# Patient Record
Sex: FEMALE | Race: WHITE
[De-identification: names, ages, dates, MRNs, and addresses within clinical notes are randomized per-mention and may not be internally consistent; named-entity substitution may affect disease eponyms.]

---

## 2018-02-05 ENCOUNTER — HOSPITAL ENCOUNTER (EMERGENCY)
Dept: HOSPITAL 94 - ER | Age: 33
Discharge: HOME | End: 2018-02-05
Payer: MEDICAID

## 2018-02-05 VITALS — HEIGHT: 68 IN | BODY MASS INDEX: 18.98 KG/M2 | WEIGHT: 125.22 LBS

## 2018-02-05 VITALS — SYSTOLIC BLOOD PRESSURE: 137 MMHG | DIASTOLIC BLOOD PRESSURE: 90 MMHG
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
## 2018-02-05 DIAGNOSIS — Z88.1: ICD-10-CM

## 2018-02-05 DIAGNOSIS — S61.214A: Primary | ICD-10-CM
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
## 2018-02-05 PROCEDURE — 12001 RPR S/N/AX/GEN/TRNK 2.5CM/<: CPT

## 2018-02-05 PROCEDURE — 99283 EMERGENCY DEPT VISIT LOW MDM: CPT

## 2018-04-08 ENCOUNTER — HOSPITAL ENCOUNTER (EMERGENCY)
Dept: HOSPITAL 94 - ER | Age: 33
Discharge: HOME | End: 2018-04-08
Payer: MEDICAID

## 2018-04-08 VITALS — DIASTOLIC BLOOD PRESSURE: 98 MMHG | SYSTOLIC BLOOD PRESSURE: 136 MMHG

## 2018-04-08 VITALS — BODY MASS INDEX: 19.57 KG/M2 | HEIGHT: 64 IN | WEIGHT: 114.64 LBS

## 2018-04-08 DIAGNOSIS — Z60.2: ICD-10-CM

## 2018-04-08 DIAGNOSIS — I88.9: Primary | ICD-10-CM

## 2018-04-08 DIAGNOSIS — Z88.1: ICD-10-CM

## 2018-04-08 DIAGNOSIS — Z79.899: ICD-10-CM

## 2018-04-08 PROCEDURE — 99283 EMERGENCY DEPT VISIT LOW MDM: CPT

## 2018-04-08 SDOH — SOCIAL STABILITY - SOCIAL INSECURITY: PROBLEMS RELATED TO LIVING ALONE: Z60.2

## 2018-08-07 ENCOUNTER — HOSPITAL ENCOUNTER (EMERGENCY)
Dept: HOSPITAL 94 - ER | Age: 33
Discharge: HOME | End: 2018-08-07
Payer: MEDICAID

## 2018-08-07 VITALS — BODY MASS INDEX: 17.3 KG/M2 | WEIGHT: 110.23 LBS | HEIGHT: 67 IN

## 2018-08-07 VITALS — SYSTOLIC BLOOD PRESSURE: 104 MMHG | DIASTOLIC BLOOD PRESSURE: 58 MMHG

## 2018-08-07 DIAGNOSIS — Z90.89: ICD-10-CM

## 2018-08-07 DIAGNOSIS — O23.42: Primary | ICD-10-CM

## 2018-08-07 DIAGNOSIS — Z3A.14: ICD-10-CM

## 2018-08-07 DIAGNOSIS — Z60.2: ICD-10-CM

## 2018-08-07 DIAGNOSIS — Z79.899: ICD-10-CM

## 2018-08-07 LAB
ALBUMIN SERPL BCP-MCNC: 3.3 G/DL (ref 3.4–5)
ALBUMIN/GLOB SERPL: 1.1 {RATIO} (ref 1.1–1.5)
ALP SERPL-CCNC: 49 IU/L (ref 46–116)
ALT SERPL W P-5'-P-CCNC: 19 U/L (ref 12–78)
AMPHETAMINES UR QL SCN: POSITIVE
ANION GAP SERPL CALCULATED.3IONS-SCNC: 6 MMOL/L (ref 8–16)
AST SERPL W P-5'-P-CCNC: 14 U/L (ref 10–37)
BACTERIA URNS QL MICRO: (no result) /HPF
BARBITURATES UR QL SCN: NEGATIVE
BASOPHILS # BLD AUTO: 0 X10'3 (ref 0–0.2)
BASOPHILS NFR BLD AUTO: 0.4 % (ref 0–1)
BENZODIAZ UR QL SCN: POSITIVE
BILIRUB SERPL-MCNC: 0.2 MG/DL (ref 0.1–1)
BUN SERPL-MCNC: 10 MG/DL (ref 7–18)
BUN/CREAT SERPL: 16.7 (ref 6.6–38)
BZE UR QL SCN: NEGATIVE
CALCIUM SERPL-MCNC: 8.6 MG/DL (ref 8.5–10.1)
CANNABINOIDS UR QL SCN: NEGATIVE
CHLORIDE SERPL-SCNC: 101 MMOL/L (ref 99–107)
CLARITY UR: (no result)
CO2 SERPL-SCNC: 29 MMOL/L (ref 24–32)
COLOR UR: YELLOW
CREAT SERPL-MCNC: 0.6 MG/DL (ref 0.4–0.9)
DEPRECATED SQUAMOUS URNS QL MICRO: (no result) /LPF
EOSINOPHIL # BLD AUTO: 0.3 X10'3 (ref 0–0.9)
EOSINOPHIL NFR BLD AUTO: 3.8 % (ref 0–6)
ERYTHROCYTE [DISTWIDTH] IN BLOOD BY AUTOMATED COUNT: 12.5 % (ref 11.5–14.5)
GFR SERPL CREATININE-BSD FRML MDRD: > 90 ML/MIN
GLUCOSE SERPL-MCNC: 82 MG/DL (ref 70–104)
GLUCOSE UR STRIP-MCNC: NEGATIVE MG/DL
HCG SERPL-ACNC: (no result) MIU/ML
HCT VFR BLD AUTO: 32.1 % (ref 35–45)
HGB BLD-MCNC: 11 G/DL (ref 12–16)
HGB UR QL STRIP: NEGATIVE
KETONES UR STRIP-MCNC: NEGATIVE MG/DL
LEUKOCYTE ESTERASE UR QL STRIP: (no result)
LIPASE SERPL-CCNC: 73 U/L (ref 73–393)
LYMPHOCYTES # BLD AUTO: 3.6 X10'3 (ref 1.1–4.8)
LYMPHOCYTES NFR BLD AUTO: 43.3 % (ref 21–51)
MCH RBC QN AUTO: 30.1 PG (ref 27–31)
MCHC RBC AUTO-ENTMCNC: 34.3 % (ref 33–36.5)
MCV RBC AUTO: 87.9 FL (ref 78–98)
METHADONE UR QL SCN: NEGATIVE
MONOCYTES # BLD AUTO: 0.4 X10'3 (ref 0–0.9)
MONOCYTES NFR BLD AUTO: 4.5 % (ref 2–12)
MUCOUS THREADS URNS QL MICRO: (no result) /LPF
NEUTROPHILS # BLD AUTO: 4 X10'3 (ref 1.8–7.7)
NEUTROPHILS NFR BLD AUTO: 48 % (ref 42–75)
NITRITE UR QL STRIP: NEGATIVE
OPIATES UR QL SCN: NEGATIVE
PCP UR QL SCN: NEGATIVE
PH UR STRIP: 6 [PH] (ref 4.8–8)
PLATELET # BLD AUTO: 268 X10'3 (ref 140–440)
PMV BLD AUTO: 8.5 FL (ref 7.4–10.4)
POTASSIUM SERPL-SCNC: 3.3 MMOL/L (ref 3.5–5.1)
PROT SERPL-MCNC: 6.2 G/DL (ref 6.4–8.2)
PROT UR QL STRIP: (no result) MG/DL
RBC # BLD AUTO: 3.65 X10'6 (ref 4.2–5.6)
RBC #/AREA URNS HPF: (no result) /HPF (ref 0–2)
SODIUM SERPL-SCNC: 136 MMOL/L (ref 135–145)
SP GR UR STRIP: 1.02 (ref 1–1.03)
URN COLLECT METHOD CLASS: (no result)
UROBILINOGEN UR STRIP-MCNC: 0.2 E.U/DL (ref 0.2–1)
WBC # BLD AUTO: 8.2 X10'3 (ref 4.5–11)
WBC #/AREA URNS HPF: (no result) /HPF (ref 0–4)

## 2018-08-07 PROCEDURE — 99285 EMERGENCY DEPT VISIT HI MDM: CPT

## 2018-08-07 PROCEDURE — 80053 COMPREHEN METABOLIC PANEL: CPT

## 2018-08-07 PROCEDURE — 83690 ASSAY OF LIPASE: CPT

## 2018-08-07 PROCEDURE — 84702 CHORIONIC GONADOTROPIN TEST: CPT

## 2018-08-07 PROCEDURE — 80305 DRUG TEST PRSMV DIR OPT OBS: CPT

## 2018-08-07 PROCEDURE — 81001 URINALYSIS AUTO W/SCOPE: CPT

## 2018-08-07 PROCEDURE — 85025 COMPLETE CBC W/AUTO DIFF WBC: CPT

## 2018-08-07 PROCEDURE — 76805 OB US >/= 14 WKS SNGL FETUS: CPT

## 2018-08-07 PROCEDURE — 36415 COLL VENOUS BLD VENIPUNCTURE: CPT

## 2018-08-07 SDOH — SOCIAL STABILITY - SOCIAL INSECURITY: PROBLEMS RELATED TO LIVING ALONE: Z60.2

## 2018-08-09 ENCOUNTER — HOSPITAL ENCOUNTER (EMERGENCY)
Dept: HOSPITAL 94 - ER | Age: 33
LOS: 1 days | Discharge: LEFT BEFORE DISCHARGE | End: 2018-08-10
Payer: MEDICAID

## 2018-08-09 VITALS — DIASTOLIC BLOOD PRESSURE: 67 MMHG | SYSTOLIC BLOOD PRESSURE: 107 MMHG

## 2018-08-09 VITALS — WEIGHT: 105.93 LBS | HEIGHT: 67 IN | BODY MASS INDEX: 16.63 KG/M2

## 2018-08-09 DIAGNOSIS — F12.10: ICD-10-CM

## 2018-08-09 DIAGNOSIS — Z79.899: ICD-10-CM

## 2018-08-09 DIAGNOSIS — N39.0: ICD-10-CM

## 2018-08-09 DIAGNOSIS — Z3A.14: ICD-10-CM

## 2018-08-09 DIAGNOSIS — R00.0: ICD-10-CM

## 2018-08-09 DIAGNOSIS — R07.89: ICD-10-CM

## 2018-08-09 DIAGNOSIS — Z60.2: ICD-10-CM

## 2018-08-09 DIAGNOSIS — Z90.89: ICD-10-CM

## 2018-08-09 DIAGNOSIS — O26.891: Primary | ICD-10-CM

## 2018-08-09 LAB
ALBUMIN SERPL BCP-MCNC: 3.8 G/DL (ref 3.4–5)
ALBUMIN/GLOB SERPL: 1.2 {RATIO} (ref 1.1–1.5)
ALP SERPL-CCNC: 83 IU/L (ref 46–116)
ALT SERPL W P-5'-P-CCNC: 101 U/L (ref 12–78)
ANION GAP SERPL CALCULATED.3IONS-SCNC: 7 MMOL/L (ref 8–16)
APTT PPP: 26 SECONDS (ref 22–32)
AST SERPL W P-5'-P-CCNC: 182 U/L (ref 10–37)
BASOPHILS # BLD AUTO: 0 X10'3 (ref 0–0.2)
BASOPHILS NFR BLD AUTO: 0 % (ref 0–1)
BILIRUB SERPL-MCNC: 0.7 MG/DL (ref 0.1–1)
BUN SERPL-MCNC: 7 MG/DL (ref 7–18)
BUN/CREAT SERPL: 12.7 (ref 6.6–38)
CALCIUM SERPL-MCNC: 9.3 MG/DL (ref 8.5–10.1)
CHLORIDE SERPL-SCNC: 101 MMOL/L (ref 99–107)
CO2 SERPL-SCNC: 28.7 MMOL/L (ref 24–32)
CREAT SERPL-MCNC: 0.55 MG/DL (ref 0.4–0.9)
EOSINOPHIL # BLD AUTO: 0.5 X10'3 (ref 0–0.9)
EOSINOPHIL NFR BLD AUTO: 2.5 % (ref 0–6)
ERYTHROCYTE [DISTWIDTH] IN BLOOD BY AUTOMATED COUNT: 12.4 % (ref 11.5–14.5)
ETHANOL SERPL-MCNC: < 0.01 GM/DL (ref 0–0.01)
GFR SERPL CREATININE-BSD FRML MDRD: > 90 ML/MIN
GLUCOSE SERPL-MCNC: 96 MG/DL (ref 70–104)
HCT VFR BLD AUTO: 35.7 % (ref 35–45)
HGB BLD-MCNC: 12.3 G/DL (ref 12–16)
INR PPP: 0.9 INR
LYMPHOCYTES # BLD AUTO: 0.9 X10'3 (ref 1.1–4.8)
LYMPHOCYTES NFR BLD AUTO: 4.6 % (ref 21–51)
MCH RBC QN AUTO: 30.5 PG (ref 27–31)
MCHC RBC AUTO-ENTMCNC: 34.3 % (ref 33–36.5)
MCV RBC AUTO: 88.8 FL (ref 78–98)
MONOCYTES # BLD AUTO: 0.3 X10'3 (ref 0–0.9)
MONOCYTES NFR BLD AUTO: 1.4 % (ref 2–12)
NEUTROPHILS # BLD AUTO: 18.2 X10'3 (ref 1.8–7.7)
NEUTROPHILS NFR BLD AUTO: 91.5 % (ref 42–75)
PLATELET # BLD AUTO: 313 X10'3 (ref 140–440)
PMV BLD AUTO: 8.3 FL (ref 7.4–10.4)
POTASSIUM SERPL-SCNC: 3.7 MMOL/L (ref 3.5–5.1)
PROT SERPL-MCNC: 7.1 G/DL (ref 6.4–8.2)
PROTHROMBIN TIME: 9.8 SECONDS (ref 9–12)
RBC # BLD AUTO: 4.02 X10'6 (ref 4.2–5.6)
SODIUM SERPL-SCNC: 137 MMOL/L (ref 135–145)
WBC # BLD AUTO: 19.9 X10'3 (ref 4.5–11)

## 2018-08-09 PROCEDURE — 85610 PROTHROMBIN TIME: CPT

## 2018-08-09 PROCEDURE — 99285 EMERGENCY DEPT VISIT HI MDM: CPT

## 2018-08-09 PROCEDURE — 80053 COMPREHEN METABOLIC PANEL: CPT

## 2018-08-09 PROCEDURE — 36415 COLL VENOUS BLD VENIPUNCTURE: CPT

## 2018-08-09 PROCEDURE — 84484 ASSAY OF TROPONIN QUANT: CPT

## 2018-08-09 PROCEDURE — 85730 THROMBOPLASTIN TIME PARTIAL: CPT

## 2018-08-09 PROCEDURE — 80320 DRUG SCREEN QUANTALCOHOLS: CPT

## 2018-08-09 PROCEDURE — 85025 COMPLETE CBC W/AUTO DIFF WBC: CPT

## 2018-08-09 PROCEDURE — 93005 ELECTROCARDIOGRAM TRACING: CPT

## 2018-08-09 PROCEDURE — 93978 VASCULAR STUDY: CPT

## 2018-08-09 SDOH — SOCIAL STABILITY - SOCIAL INSECURITY: PROBLEMS RELATED TO LIVING ALONE: Z60.2

## 2019-11-14 ENCOUNTER — HOSPITAL ENCOUNTER (EMERGENCY)
Dept: HOSPITAL 94 - ER | Age: 34
Discharge: HOME | End: 2019-11-14
Payer: MEDICAID

## 2019-11-14 VITALS — DIASTOLIC BLOOD PRESSURE: 77 MMHG | SYSTOLIC BLOOD PRESSURE: 135 MMHG

## 2019-11-14 VITALS — BODY MASS INDEX: 18.09 KG/M2 | HEIGHT: 67 IN | WEIGHT: 115.24 LBS

## 2019-11-14 DIAGNOSIS — Z90.89: ICD-10-CM

## 2019-11-14 DIAGNOSIS — F41.9: ICD-10-CM

## 2019-11-14 DIAGNOSIS — F32.9: ICD-10-CM

## 2019-11-14 DIAGNOSIS — F11.90: ICD-10-CM

## 2019-11-14 DIAGNOSIS — Z00.00: Primary | ICD-10-CM

## 2019-11-14 DIAGNOSIS — Z79.899: ICD-10-CM

## 2019-11-14 DIAGNOSIS — F17.210: ICD-10-CM

## 2019-11-14 DIAGNOSIS — Z60.2: ICD-10-CM

## 2019-11-14 DIAGNOSIS — F15.90: ICD-10-CM

## 2019-11-14 PROCEDURE — 99281 EMR DPT VST MAYX REQ PHY/QHP: CPT

## 2019-11-14 SDOH — SOCIAL STABILITY - SOCIAL INSECURITY: PROBLEMS RELATED TO LIVING ALONE: Z60.2

## 2019-11-21 ENCOUNTER — HOSPITAL ENCOUNTER (EMERGENCY)
Dept: HOSPITAL 94 - ER | Age: 34
Discharge: HOME | End: 2019-11-21
Payer: MEDICAID

## 2019-11-21 VITALS — DIASTOLIC BLOOD PRESSURE: 73 MMHG | SYSTOLIC BLOOD PRESSURE: 105 MMHG

## 2019-11-21 VITALS — HEIGHT: 67 IN | WEIGHT: 108.03 LBS | BODY MASS INDEX: 16.96 KG/M2

## 2019-11-21 DIAGNOSIS — Z90.89: ICD-10-CM

## 2019-11-21 DIAGNOSIS — F11.90: ICD-10-CM

## 2019-11-21 DIAGNOSIS — B37.3: Primary | ICD-10-CM

## 2019-11-21 DIAGNOSIS — F15.90: ICD-10-CM

## 2019-11-21 DIAGNOSIS — Z87.440: ICD-10-CM

## 2019-11-21 DIAGNOSIS — Z79.899: ICD-10-CM

## 2019-11-21 PROCEDURE — 99283 EMERGENCY DEPT VISIT LOW MDM: CPT

## 2020-05-07 ENCOUNTER — HOSPITAL ENCOUNTER (EMERGENCY)
Dept: HOSPITAL 94 - ER | Age: 35
Discharge: HOME | End: 2020-05-07
Payer: MEDICAID

## 2020-05-07 VITALS — DIASTOLIC BLOOD PRESSURE: 94 MMHG | SYSTOLIC BLOOD PRESSURE: 134 MMHG

## 2020-05-07 VITALS — BODY MASS INDEX: 18.88 KG/M2 | WEIGHT: 120.26 LBS | HEIGHT: 67 IN

## 2020-05-07 DIAGNOSIS — F41.9: ICD-10-CM

## 2020-05-07 DIAGNOSIS — F17.200: ICD-10-CM

## 2020-05-07 DIAGNOSIS — Z90.89: ICD-10-CM

## 2020-05-07 DIAGNOSIS — Z60.2: ICD-10-CM

## 2020-05-07 DIAGNOSIS — F32.9: ICD-10-CM

## 2020-05-07 DIAGNOSIS — B86: Primary | ICD-10-CM

## 2020-05-07 DIAGNOSIS — Z79.899: ICD-10-CM

## 2020-05-07 DIAGNOSIS — Z87.440: ICD-10-CM

## 2020-05-07 DIAGNOSIS — F11.90: ICD-10-CM

## 2020-05-07 DIAGNOSIS — F15.90: ICD-10-CM

## 2020-05-07 PROCEDURE — 99283 EMERGENCY DEPT VISIT LOW MDM: CPT

## 2020-05-07 SDOH — SOCIAL STABILITY - SOCIAL INSECURITY: PROBLEMS RELATED TO LIVING ALONE: Z60.2

## 2020-06-29 ENCOUNTER — HOSPITAL ENCOUNTER (EMERGENCY)
Dept: HOSPITAL 94 - ER | Age: 35
Discharge: HOME | End: 2020-06-29
Payer: MEDICAID

## 2020-06-29 VITALS — SYSTOLIC BLOOD PRESSURE: 135 MMHG | DIASTOLIC BLOOD PRESSURE: 85 MMHG

## 2020-06-29 VITALS — WEIGHT: 111.55 LBS | BODY MASS INDEX: 17.51 KG/M2 | HEIGHT: 67 IN

## 2020-06-29 DIAGNOSIS — F41.9: ICD-10-CM

## 2020-06-29 DIAGNOSIS — F11.90: ICD-10-CM

## 2020-06-29 DIAGNOSIS — F32.9: ICD-10-CM

## 2020-06-29 DIAGNOSIS — Z60.2: ICD-10-CM

## 2020-06-29 DIAGNOSIS — Z98.890: ICD-10-CM

## 2020-06-29 DIAGNOSIS — R20.0: Primary | ICD-10-CM

## 2020-06-29 DIAGNOSIS — M54.2: ICD-10-CM

## 2020-06-29 DIAGNOSIS — F15.90: ICD-10-CM

## 2020-06-29 DIAGNOSIS — Z79.899: ICD-10-CM

## 2020-06-29 LAB
ALBUMIN SERPL BCP-MCNC: 3.8 G/DL (ref 3.4–5)
ALBUMIN/GLOB SERPL: 1.4 {RATIO} (ref 1.1–1.5)
ALP SERPL-CCNC: 69 IU/L (ref 46–116)
ALT SERPL W P-5'-P-CCNC: 22 U/L (ref 12–78)
ANION GAP SERPL CALCULATED.3IONS-SCNC: 4 MMOL/L (ref 8–16)
AST SERPL W P-5'-P-CCNC: 21 U/L (ref 10–37)
BACTERIA URNS QL MICRO: (no result) /HPF
BASOPHILS # BLD AUTO: 0.1 X10'3 (ref 0–0.2)
BASOPHILS NFR BLD AUTO: 1 % (ref 0–1)
BILIRUB SERPL-MCNC: 0.2 MG/DL (ref 0.1–1)
BUN SERPL-MCNC: 8 MG/DL (ref 7–18)
BUN/CREAT SERPL: 12.1 (ref 6.6–38)
CALCIUM SERPL-MCNC: 8.7 MG/DL (ref 8.5–10.1)
CAOX CRY URNS QL MICRO: (no result) /HPF
CHLORIDE SERPL-SCNC: 105 MMOL/L (ref 99–107)
CLARITY UR: (no result)
CO2 SERPL-SCNC: 32.9 MMOL/L (ref 24–32)
COLOR UR: YELLOW
CREAT SERPL-MCNC: 0.66 MG/DL (ref 0.4–0.9)
DEPRECATED SQUAMOUS URNS QL MICRO: (no result) /LPF
EOSINOPHIL # BLD AUTO: 0.3 X10'3 (ref 0–0.9)
EOSINOPHIL NFR BLD AUTO: 4.7 % (ref 0–6)
ERYTHROCYTE [DISTWIDTH] IN BLOOD BY AUTOMATED COUNT: 12.9 % (ref 11.5–14.5)
GFR SERPL CREATININE-BSD FRML MDRD: > 90 ML/MIN
GLUCOSE SERPL-MCNC: 83 MG/DL (ref 70–104)
GLUCOSE UR STRIP-MCNC: NEGATIVE MG/DL
HCG UR QL: NEGATIVE
HCT VFR BLD AUTO: 38.5 % (ref 35–45)
HGB BLD-MCNC: 12.8 G/DL (ref 12–16)
HGB UR QL STRIP: NEGATIVE
KETONES UR STRIP-MCNC: (no result) MG/DL
LEUKOCYTE ESTERASE UR QL STRIP: (no result)
LYMPHOCYTES # BLD AUTO: 2.9 X10'3 (ref 1.1–4.8)
LYMPHOCYTES NFR BLD AUTO: 43.7 % (ref 21–51)
MCH RBC QN AUTO: 29.4 PG (ref 27–31)
MCHC RBC AUTO-ENTMCNC: 33.1 G/DL (ref 33–36.5)
MCV RBC AUTO: 88.9 FL (ref 78–98)
MONOCYTES # BLD AUTO: 0.4 X10'3 (ref 0–0.9)
MONOCYTES NFR BLD AUTO: 6.7 % (ref 2–12)
MUCOUS THREADS URNS QL MICRO: (no result) /LPF
NEUTROPHILS # BLD AUTO: 2.9 X10'3 (ref 1.8–7.7)
NEUTROPHILS NFR BLD AUTO: 43.9 % (ref 42–75)
NITRITE UR QL STRIP: NEGATIVE
PH UR STRIP: 6 [PH] (ref 4.8–8)
PLATELET # BLD AUTO: 208 X10'3 (ref 140–440)
PMV BLD AUTO: 9.5 FL (ref 7.4–10.4)
POTASSIUM SERPL-SCNC: 3.8 MMOL/L (ref 3.5–5.1)
PROT SERPL-MCNC: 6.6 G/DL (ref 6.4–8.2)
PROT UR QL STRIP: (no result) MG/DL
RBC # BLD AUTO: 4.33 X10'6 (ref 4.2–5.6)
RBC #/AREA URNS HPF: (no result) /HPF (ref 0–2)
SODIUM SERPL-SCNC: 142 MMOL/L (ref 135–145)
SP GR UR STRIP: >=1.03 (ref 1–1.03)
URN COLLECT METHOD CLASS: (no result)
UROBILINOGEN UR STRIP-MCNC: 0.2 E.U/DL (ref 0.2–1)
WBC # BLD AUTO: 6.6 X10'3 (ref 4.5–11)
WBC #/AREA URNS HPF: (no result) /HPF (ref 0–4)

## 2020-06-29 PROCEDURE — 85025 COMPLETE CBC W/AUTO DIFF WBC: CPT

## 2020-06-29 PROCEDURE — 81025 URINE PREGNANCY TEST: CPT

## 2020-06-29 PROCEDURE — 80053 COMPREHEN METABOLIC PANEL: CPT

## 2020-06-29 PROCEDURE — 84443 ASSAY THYROID STIM HORMONE: CPT

## 2020-06-29 PROCEDURE — 99283 EMERGENCY DEPT VISIT LOW MDM: CPT

## 2020-06-29 PROCEDURE — 36415 COLL VENOUS BLD VENIPUNCTURE: CPT

## 2020-06-29 PROCEDURE — 81001 URINALYSIS AUTO W/SCOPE: CPT

## 2020-06-29 SDOH — SOCIAL STABILITY - SOCIAL INSECURITY: PROBLEMS RELATED TO LIVING ALONE: Z60.2

## 2020-06-29 NOTE — NUR
Denies pain to leg or any recent injury or proglonged sitting/standing. She 
states the right leg has decreased sensation and some weakness.

## 2021-01-12 ENCOUNTER — HOSPITAL ENCOUNTER (EMERGENCY)
Dept: HOSPITAL 94 - ER | Age: 36
Discharge: HOME | End: 2021-01-12
Payer: MEDICAID

## 2021-01-12 VITALS — SYSTOLIC BLOOD PRESSURE: 108 MMHG | DIASTOLIC BLOOD PRESSURE: 72 MMHG

## 2021-01-12 VITALS — WEIGHT: 135.28 LBS | BODY MASS INDEX: 21.23 KG/M2 | HEIGHT: 67 IN

## 2021-01-12 DIAGNOSIS — Z79.899: ICD-10-CM

## 2021-01-12 DIAGNOSIS — Z88.0: ICD-10-CM

## 2021-01-12 DIAGNOSIS — F11.90: ICD-10-CM

## 2021-01-12 DIAGNOSIS — R10.13: Primary | ICD-10-CM

## 2021-01-12 DIAGNOSIS — F15.90: ICD-10-CM

## 2021-01-12 DIAGNOSIS — Z72.89: ICD-10-CM

## 2021-01-12 LAB
ALBUMIN SERPL BCP-MCNC: 4 G/DL (ref 3.4–5)
ALBUMIN/GLOB SERPL: 1.3 {RATIO} (ref 1.1–1.5)
ALP SERPL-CCNC: 75 IU/L (ref 46–116)
ALT SERPL W P-5'-P-CCNC: 45 U/L (ref 12–78)
AMORPH URATE CRY #/AREA URNS HPF: (no result) /[HPF]
AMPHETAMINES UR QL SCN: POSITIVE
ANION GAP SERPL CALCULATED.3IONS-SCNC: 7 MMOL/L (ref 8–16)
AST SERPL W P-5'-P-CCNC: 79 U/L (ref 10–37)
BACTERIA URNS QL MICRO: (no result) /HPF
BARBITURATES UR QL SCN: NEGATIVE
BASOPHILS # BLD AUTO: 0 X10'3 (ref 0–0.2)
BASOPHILS NFR BLD AUTO: 0.1 % (ref 0–1)
BENZODIAZ UR QL SCN: NEGATIVE
BILIRUB SERPL-MCNC: 0.6 MG/DL (ref 0.1–1)
BUN SERPL-MCNC: 10 MG/DL (ref 7–18)
BUN/CREAT SERPL: 13.9 (ref 6.6–38)
BZE UR QL SCN: NEGATIVE
CALCIUM SERPL-MCNC: 9 MG/DL (ref 8.5–10.1)
CANNABINOIDS UR QL SCN: NEGATIVE
CHLORIDE SERPL-SCNC: 104 MMOL/L (ref 99–107)
CLARITY UR: (no result)
CO2 SERPL-SCNC: 31.4 MMOL/L (ref 24–32)
COLOR UR: (no result)
CREAT SERPL-MCNC: 0.72 MG/DL (ref 0.4–0.9)
DEPRECATED SQUAMOUS URNS QL MICRO: (no result) /LPF
EOSINOPHIL # BLD AUTO: 0.3 X10'3 (ref 0–0.9)
EOSINOPHIL NFR BLD AUTO: 1.8 % (ref 0–6)
ERYTHROCYTE [DISTWIDTH] IN BLOOD BY AUTOMATED COUNT: 12.8 % (ref 11.5–14.5)
GFR SERPL CREATININE-BSD FRML MDRD: > 90 ML/MIN
GLUCOSE SERPL-MCNC: 116 MG/DL (ref 70–104)
GLUCOSE UR STRIP-MCNC: NEGATIVE MG/DL
HCG UR QL: NEGATIVE
HCT VFR BLD AUTO: 40.8 % (ref 35–45)
HGB BLD-MCNC: 13.5 G/DL (ref 12–16)
HGB UR QL STRIP: (no result)
KETONES UR STRIP-MCNC: (no result) MG/DL
LEUKOCYTE ESTERASE UR QL STRIP: NEGATIVE
LYMPHOCYTES # BLD AUTO: 1 X10'3 (ref 1.1–4.8)
LYMPHOCYTES NFR BLD AUTO: 6.8 % (ref 21–51)
MCH RBC QN AUTO: 29.9 PG (ref 27–31)
MCHC RBC AUTO-ENTMCNC: 33.2 G/DL (ref 33–36.5)
MCV RBC AUTO: 90.2 FL (ref 78–98)
METHADONE UR QL SCN: POSITIVE
MONOCYTES # BLD AUTO: 0.7 X10'3 (ref 0–0.9)
MONOCYTES NFR BLD AUTO: 4.9 % (ref 2–12)
MUCOUS THREADS URNS QL MICRO: (no result) /LPF
NEUTROPHILS # BLD AUTO: 12.7 X10'3 (ref 1.8–7.7)
NEUTROPHILS NFR BLD AUTO: 86.4 % (ref 42–75)
NITRITE UR QL STRIP: NEGATIVE
OPIATES UR QL SCN: POSITIVE
PCP UR QL SCN: NEGATIVE
PH UR STRIP: 6 [PH] (ref 4.8–8)
PLATELET # BLD AUTO: 218 X10'3 (ref 140–440)
PMV BLD AUTO: 10.3 FL (ref 7.4–10.4)
POTASSIUM SERPL-SCNC: 3.8 MMOL/L (ref 3.5–5.1)
PROT SERPL-MCNC: 7 G/DL (ref 6.4–8.2)
PROT UR QL STRIP: 30 MG/DL
RBC # BLD AUTO: 4.52 X10'6 (ref 4.2–5.6)
RBC #/AREA URNS HPF: (no result) /HPF (ref 0–2)
SODIUM SERPL-SCNC: 142 MMOL/L (ref 135–145)
SP GR UR STRIP: >=1.03 (ref 1–1.03)
URN COLLECT METHOD CLASS: (no result)
UROBILINOGEN UR STRIP-MCNC: 1 E.U/DL (ref 0.2–1)
WBC # BLD AUTO: 14.7 X10'3 (ref 4.5–11)
WBC #/AREA URNS HPF: (no result) /HPF (ref 0–4)
YEAST URNS QL MICRO: (no result) /HPF

## 2021-01-12 PROCEDURE — 84484 ASSAY OF TROPONIN QUANT: CPT

## 2021-01-12 PROCEDURE — 80305 DRUG TEST PRSMV DIR OPT OBS: CPT

## 2021-01-12 PROCEDURE — 36415 COLL VENOUS BLD VENIPUNCTURE: CPT

## 2021-01-12 PROCEDURE — 85025 COMPLETE CBC W/AUTO DIFF WBC: CPT

## 2021-01-12 PROCEDURE — 93005 ELECTROCARDIOGRAM TRACING: CPT

## 2021-01-12 PROCEDURE — 99285 EMERGENCY DEPT VISIT HI MDM: CPT

## 2021-01-12 PROCEDURE — 83880 ASSAY OF NATRIURETIC PEPTIDE: CPT

## 2021-01-12 PROCEDURE — 80053 COMPREHEN METABOLIC PANEL: CPT

## 2021-01-12 PROCEDURE — 71045 X-RAY EXAM CHEST 1 VIEW: CPT

## 2021-01-12 PROCEDURE — 81001 URINALYSIS AUTO W/SCOPE: CPT

## 2021-01-12 PROCEDURE — 81025 URINE PREGNANCY TEST: CPT

## 2021-05-30 ENCOUNTER — HOSPITAL ENCOUNTER (EMERGENCY)
Dept: HOSPITAL 94 - ER | Age: 36
Discharge: HOME | End: 2021-05-30
Payer: MEDICAID

## 2021-05-30 VITALS — BODY MASS INDEX: 19.07 KG/M2 | HEIGHT: 67 IN | WEIGHT: 121.47 LBS

## 2021-05-30 VITALS — DIASTOLIC BLOOD PRESSURE: 78 MMHG | SYSTOLIC BLOOD PRESSURE: 109 MMHG

## 2021-05-30 DIAGNOSIS — Z88.0: ICD-10-CM

## 2021-05-30 DIAGNOSIS — F15.90: ICD-10-CM

## 2021-05-30 DIAGNOSIS — Z79.899: ICD-10-CM

## 2021-05-30 DIAGNOSIS — F11.90: ICD-10-CM

## 2021-05-30 DIAGNOSIS — Z90.89: ICD-10-CM

## 2021-05-30 DIAGNOSIS — H92.02: Primary | ICD-10-CM

## 2021-05-30 PROCEDURE — 99281 EMR DPT VST MAYX REQ PHY/QHP: CPT

## 2021-07-06 ENCOUNTER — HOSPITAL ENCOUNTER (EMERGENCY)
Dept: HOSPITAL 94 - ER | Age: 36
Discharge: HOME | End: 2021-07-06
Payer: MEDICAID

## 2021-07-06 VITALS — DIASTOLIC BLOOD PRESSURE: 89 MMHG | SYSTOLIC BLOOD PRESSURE: 127 MMHG

## 2021-07-06 VITALS — BODY MASS INDEX: 19.31 KG/M2 | WEIGHT: 120.15 LBS | HEIGHT: 66 IN

## 2021-07-06 DIAGNOSIS — R50.9: ICD-10-CM

## 2021-07-06 DIAGNOSIS — F41.9: ICD-10-CM

## 2021-07-06 DIAGNOSIS — Z87.440: ICD-10-CM

## 2021-07-06 DIAGNOSIS — Z90.89: ICD-10-CM

## 2021-07-06 DIAGNOSIS — Z79.899: ICD-10-CM

## 2021-07-06 DIAGNOSIS — F32.9: ICD-10-CM

## 2021-07-06 DIAGNOSIS — Z88.0: ICD-10-CM

## 2021-07-06 DIAGNOSIS — F11.90: ICD-10-CM

## 2021-07-06 DIAGNOSIS — H92.03: Primary | ICD-10-CM

## 2021-07-06 DIAGNOSIS — Z60.2: ICD-10-CM

## 2021-07-06 DIAGNOSIS — F15.90: ICD-10-CM

## 2021-07-06 DIAGNOSIS — R22.0: ICD-10-CM

## 2021-07-06 PROCEDURE — 99281 EMR DPT VST MAYX REQ PHY/QHP: CPT

## 2021-07-06 SDOH — SOCIAL STABILITY - SOCIAL INSECURITY: PROBLEMS RELATED TO LIVING ALONE: Z60.2

## 2022-01-22 ENCOUNTER — HOSPITAL ENCOUNTER (EMERGENCY)
Dept: HOSPITAL 94 - ER | Age: 37
Discharge: LEFT BEFORE DISCHARGE | End: 2022-01-22
Payer: MEDICAID

## 2022-01-22 VITALS — BODY MASS INDEX: 17.28 KG/M2 | HEIGHT: 67 IN | WEIGHT: 110.1 LBS

## 2022-01-22 VITALS — SYSTOLIC BLOOD PRESSURE: 122 MMHG | DIASTOLIC BLOOD PRESSURE: 78 MMHG

## 2022-01-22 DIAGNOSIS — Z53.21: Primary | ICD-10-CM

## 2022-01-22 LAB
BACTERIA URNS QL MICRO: (no result) /HPF
CAOX CRY URNS QL MICRO: (no result) /HPF
CLARITY UR: (no result)
COLOR UR: YELLOW
DEPRECATED SQUAMOUS URNS QL MICRO: (no result) /LPF
GLUCOSE UR STRIP-MCNC: NEGATIVE MG/DL
HCG UR QL: NEGATIVE
HGB UR QL STRIP: NEGATIVE
KETONES UR STRIP-MCNC: NEGATIVE MG/DL
LEUKOCYTE ESTERASE UR QL STRIP: NEGATIVE
MUCOUS THREADS URNS QL MICRO: (no result) /LPF
NITRITE UR QL STRIP: NEGATIVE
PH UR STRIP: 6 [PH] (ref 4.8–8)
PROT UR QL STRIP: NEGATIVE MG/DL
RBC #/AREA URNS HPF: (no result) /HPF (ref 0–2)
SP GR UR STRIP: >=1.03 (ref 1–1.03)
URN COLLECT METHOD CLASS: (no result)
UROBILINOGEN UR STRIP-MCNC: 0.2 E.U/DL (ref 0.2–1)
WBC #/AREA URNS HPF: (no result) /HPF (ref 0–4)

## 2022-01-22 PROCEDURE — 81025 URINE PREGNANCY TEST: CPT

## 2022-01-22 PROCEDURE — 81001 URINALYSIS AUTO W/SCOPE: CPT

## 2022-03-18 ENCOUNTER — HOSPITAL ENCOUNTER (EMERGENCY)
Dept: HOSPITAL 94 - ER | Age: 37
Discharge: HOME | End: 2022-03-18
Payer: MEDICAID

## 2022-03-18 VITALS — SYSTOLIC BLOOD PRESSURE: 130 MMHG | DIASTOLIC BLOOD PRESSURE: 80 MMHG

## 2022-03-18 VITALS — HEIGHT: 67 IN | BODY MASS INDEX: 20.42 KG/M2 | WEIGHT: 130.07 LBS

## 2022-03-18 DIAGNOSIS — Z87.440: ICD-10-CM

## 2022-03-18 DIAGNOSIS — R07.81: Primary | ICD-10-CM

## 2022-03-18 DIAGNOSIS — F32.A: ICD-10-CM

## 2022-03-18 DIAGNOSIS — Z60.2: ICD-10-CM

## 2022-03-18 DIAGNOSIS — Z88.0: ICD-10-CM

## 2022-03-18 DIAGNOSIS — Z90.89: ICD-10-CM

## 2022-03-18 DIAGNOSIS — M54.2: ICD-10-CM

## 2022-03-18 DIAGNOSIS — Z79.899: ICD-10-CM

## 2022-03-18 DIAGNOSIS — F41.9: ICD-10-CM

## 2022-03-18 PROCEDURE — 71045 X-RAY EXAM CHEST 1 VIEW: CPT

## 2022-03-18 PROCEDURE — 99284 EMERGENCY DEPT VISIT MOD MDM: CPT

## 2022-03-18 PROCEDURE — 72125 CT NECK SPINE W/O DYE: CPT

## 2022-03-18 SDOH — SOCIAL STABILITY - SOCIAL INSECURITY: PROBLEMS RELATED TO LIVING ALONE: Z60.2

## 2022-04-02 ENCOUNTER — HOSPITAL ENCOUNTER (EMERGENCY)
Dept: HOSPITAL 94 - ER | Age: 37
Discharge: HOME | End: 2022-04-02
Payer: MEDICAID

## 2022-04-02 VITALS — BODY MASS INDEX: 19.65 KG/M2 | HEIGHT: 67 IN | WEIGHT: 125.22 LBS

## 2022-04-02 VITALS — DIASTOLIC BLOOD PRESSURE: 76 MMHG | SYSTOLIC BLOOD PRESSURE: 123 MMHG

## 2022-04-02 DIAGNOSIS — F41.9: ICD-10-CM

## 2022-04-02 DIAGNOSIS — F32.A: ICD-10-CM

## 2022-04-02 DIAGNOSIS — Z88.0: ICD-10-CM

## 2022-04-02 DIAGNOSIS — Z79.899: ICD-10-CM

## 2022-04-02 DIAGNOSIS — Z87.440: ICD-10-CM

## 2022-04-02 DIAGNOSIS — Z60.2: ICD-10-CM

## 2022-04-02 DIAGNOSIS — M25.522: Primary | ICD-10-CM

## 2022-04-02 DIAGNOSIS — M25.532: ICD-10-CM

## 2022-04-02 PROCEDURE — 73080 X-RAY EXAM OF ELBOW: CPT

## 2022-04-02 PROCEDURE — 73110 X-RAY EXAM OF WRIST: CPT

## 2022-04-02 PROCEDURE — 99284 EMERGENCY DEPT VISIT MOD MDM: CPT

## 2022-04-02 SDOH — SOCIAL STABILITY - SOCIAL INSECURITY: PROBLEMS RELATED TO LIVING ALONE: Z60.2

## 2022-07-11 ENCOUNTER — HOSPITAL ENCOUNTER (EMERGENCY)
Dept: HOSPITAL 94 - ER | Age: 37
Discharge: HOME | End: 2022-07-11
Payer: MEDICAID

## 2022-07-11 VITALS — BODY MASS INDEX: 21.23 KG/M2 | HEIGHT: 67 IN | WEIGHT: 135.28 LBS

## 2022-07-11 VITALS — DIASTOLIC BLOOD PRESSURE: 66 MMHG | SYSTOLIC BLOOD PRESSURE: 99 MMHG

## 2022-07-11 DIAGNOSIS — N39.0: Primary | ICD-10-CM

## 2022-07-11 DIAGNOSIS — R10.9: ICD-10-CM

## 2022-07-11 DIAGNOSIS — Z98.890: ICD-10-CM

## 2022-07-11 LAB
ALBUMIN SERPL BCP-MCNC: 3.8 G/DL (ref 3.4–5)
ALBUMIN/GLOB SERPL: 1.2 {RATIO} (ref 1.1–1.5)
ALP SERPL-CCNC: 82 IU/L (ref 46–116)
ALT SERPL W P-5'-P-CCNC: 28 U/L (ref 12–78)
ANION GAP SERPL CALCULATED.3IONS-SCNC: 3 MMOL/L (ref 8–16)
AST SERPL W P-5'-P-CCNC: 24 U/L (ref 10–37)
BACTERIA URNS QL MICRO: (no result) /HPF
BASOPHILS # BLD AUTO: 0.1 X10'3 (ref 0–0.2)
BASOPHILS NFR BLD AUTO: 0.8 % (ref 0–1)
BILIRUB SERPL-MCNC: 0.2 MG/DL (ref 0.1–1)
BUN SERPL-MCNC: 10 MG/DL (ref 7–18)
BUN/CREAT SERPL: 14.9 (ref 6.6–38)
CALCIUM SERPL-MCNC: 8.9 MG/DL (ref 8.5–10.1)
CHLORIDE SERPL-SCNC: 104 MMOL/L (ref 99–107)
CLARITY UR: (no result)
CO2 SERPL-SCNC: 31.2 MMOL/L (ref 24–32)
COLOR UR: YELLOW
CREAT SERPL-MCNC: 0.67 MG/DL (ref 0.4–0.9)
DEPRECATED SQUAMOUS URNS QL MICRO: (no result) /LPF
EOSINOPHIL # BLD AUTO: 0.4 X10'3 (ref 0–0.9)
EOSINOPHIL NFR BLD AUTO: 5.3 % (ref 0–6)
ERYTHROCYTE [DISTWIDTH] IN BLOOD BY AUTOMATED COUNT: 13.8 % (ref 11.5–14.5)
GFR SERPL CREATININE-BSD FRML MDRD: > 90 ML/MIN
GLUCOSE SERPL-MCNC: 78 MG/DL (ref 70–104)
GLUCOSE UR STRIP-MCNC: NEGATIVE MG/DL
HCG UR QL: NEGATIVE
HCT VFR BLD AUTO: 38.2 % (ref 35–45)
HGB BLD-MCNC: 12.6 G/DL (ref 12–16)
HGB UR QL STRIP: (no result)
KETONES UR STRIP-MCNC: (no result) MG/DL
LEUKOCYTE ESTERASE UR QL STRIP: (no result)
LIPASE SERPL-CCNC: < 50 U/L (ref 73–393)
LYMPHOCYTES # BLD AUTO: 2.3 X10'3 (ref 1.1–4.8)
LYMPHOCYTES NFR BLD AUTO: 34.1 % (ref 21–51)
MCH RBC QN AUTO: 28.1 PG (ref 27–31)
MCHC RBC AUTO-ENTMCNC: 32.9 G/DL (ref 33–36.5)
MCV RBC AUTO: 85.3 FL (ref 78–98)
MONOCYTES # BLD AUTO: 0.5 X10'3 (ref 0–0.9)
MONOCYTES NFR BLD AUTO: 7 % (ref 2–12)
MUCOUS THREADS URNS QL MICRO: (no result) /LPF
NEUTROPHILS # BLD AUTO: 3.6 X10'3 (ref 1.8–7.7)
NEUTROPHILS NFR BLD AUTO: 52.8 % (ref 42–75)
NITRITE UR QL STRIP: NEGATIVE
PH UR STRIP: 7 [PH] (ref 4.8–8)
PLATELET # BLD AUTO: 253 X10'3 (ref 140–440)
PMV BLD AUTO: 9.3 FL (ref 7.4–10.4)
POTASSIUM SERPL-SCNC: 3.9 MMOL/L (ref 3.5–5.1)
PROT SERPL-MCNC: 7 G/DL (ref 6.4–8.2)
PROT UR QL STRIP: NEGATIVE MG/DL
RBC # BLD AUTO: 4.48 X10'6 (ref 4.2–5.6)
RBC #/AREA URNS HPF: (no result) /HPF (ref 0–2)
SODIUM SERPL-SCNC: 138 MMOL/L (ref 135–145)
SP GR UR STRIP: 1.02 (ref 1–1.03)
URN COLLECT METHOD CLASS: (no result)
UROBILINOGEN UR STRIP-MCNC: 0.2 E.U/DL (ref 0.2–1)
WBC # BLD AUTO: 6.8 X10'3 (ref 4.5–11)
WBC #/AREA URNS HPF: (no result) /HPF (ref 0–4)

## 2022-07-11 PROCEDURE — 85025 COMPLETE CBC W/AUTO DIFF WBC: CPT

## 2022-07-11 PROCEDURE — 81025 URINE PREGNANCY TEST: CPT

## 2022-07-11 PROCEDURE — 74177 CT ABD & PELVIS W/CONTRAST: CPT

## 2022-07-11 PROCEDURE — 87088 URINE BACTERIA CULTURE: CPT

## 2022-07-11 PROCEDURE — 99285 EMERGENCY DEPT VISIT HI MDM: CPT

## 2022-07-11 PROCEDURE — 36415 COLL VENOUS BLD VENIPUNCTURE: CPT

## 2022-07-11 PROCEDURE — 81001 URINALYSIS AUTO W/SCOPE: CPT

## 2022-07-11 PROCEDURE — 80053 COMPREHEN METABOLIC PANEL: CPT

## 2022-07-11 PROCEDURE — 83690 ASSAY OF LIPASE: CPT

## 2022-07-12 ENCOUNTER — HOSPITAL ENCOUNTER (EMERGENCY)
Dept: HOSPITAL 94 - ER | Age: 37
Discharge: HOME | End: 2022-07-12
Payer: MEDICAID

## 2022-07-12 VITALS — WEIGHT: 135.28 LBS | HEIGHT: 67 IN | BODY MASS INDEX: 21.23 KG/M2
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
## 2022-07-12 DIAGNOSIS — Z79.82: ICD-10-CM
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
## 2022-07-12 DIAGNOSIS — Z79.899: ICD-10-CM

## 2022-07-12 DIAGNOSIS — R07.81: Primary | ICD-10-CM

## 2022-07-12 DIAGNOSIS — M54.9: ICD-10-CM

## 2022-07-12 DIAGNOSIS — F32.A: ICD-10-CM

## 2022-07-12 DIAGNOSIS — R10.9: ICD-10-CM
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
## 2022-07-12 DIAGNOSIS — F41.9: ICD-10-CM

## 2022-07-12 PROCEDURE — 99282 EMERGENCY DEPT VISIT SF MDM: CPT

## 2022-08-08 ENCOUNTER — HOSPITAL ENCOUNTER (EMERGENCY)
Dept: HOSPITAL 94 - ER | Age: 37
Discharge: HOME | End: 2022-08-08
Payer: MEDICAID

## 2022-08-08 VITALS — HEIGHT: 67 IN | BODY MASS INDEX: 21.23 KG/M2 | WEIGHT: 135.28 LBS

## 2022-08-08 VITALS — DIASTOLIC BLOOD PRESSURE: 83 MMHG | SYSTOLIC BLOOD PRESSURE: 117 MMHG

## 2022-08-08 DIAGNOSIS — F15.10: Primary | ICD-10-CM

## 2022-08-08 DIAGNOSIS — Z79.1: ICD-10-CM

## 2022-08-08 DIAGNOSIS — Z79.899: ICD-10-CM

## 2022-08-08 DIAGNOSIS — F31.9: ICD-10-CM

## 2022-08-08 DIAGNOSIS — F17.200: ICD-10-CM

## 2022-08-08 PROCEDURE — 93005 ELECTROCARDIOGRAM TRACING: CPT

## 2022-08-08 PROCEDURE — 99283 EMERGENCY DEPT VISIT LOW MDM: CPT

## 2022-08-18 ENCOUNTER — HOSPITAL ENCOUNTER (OUTPATIENT)
Dept: HOSPITAL 94 - RAD | Age: 37
Discharge: HOME | End: 2022-08-18
Attending: GENERAL PRACTICE
Payer: MEDICAID

## 2022-08-18 DIAGNOSIS — I51.7: Primary | ICD-10-CM

## 2022-08-18 DIAGNOSIS — F11.20: ICD-10-CM

## 2022-08-18 PROCEDURE — 93005 ELECTROCARDIOGRAM TRACING: CPT

## 2022-10-14 ENCOUNTER — HOSPITAL ENCOUNTER (OUTPATIENT)
Dept: HOSPITAL 94 - RAD | Age: 37
Discharge: HOME | End: 2022-10-14
Attending: GENERAL PRACTICE
Payer: MEDICAID

## 2022-10-14 DIAGNOSIS — F11.20: Primary | ICD-10-CM

## 2022-10-14 PROCEDURE — 93005 ELECTROCARDIOGRAM TRACING: CPT

## 2023-09-06 ENCOUNTER — HOSPITAL ENCOUNTER (OUTPATIENT)
Dept: HOSPITAL 94 - RAD | Age: 38
Discharge: HOME | End: 2023-09-06
Attending: PHYSICIAN ASSISTANT
Payer: MEDICAID

## 2023-09-06 DIAGNOSIS — R00.1: Primary | ICD-10-CM

## 2023-09-06 DIAGNOSIS — F11.20: ICD-10-CM

## 2023-09-06 PROCEDURE — 93005 ELECTROCARDIOGRAM TRACING: CPT

## 2024-08-20 ENCOUNTER — HOSPITAL ENCOUNTER (OUTPATIENT)
Dept: HOSPITAL 94 - RAD | Age: 39
Discharge: HOME | End: 2024-08-20
Attending: PHYSICIAN ASSISTANT
Payer: MEDICAID

## 2024-08-20 DIAGNOSIS — F11.20: Primary | ICD-10-CM

## 2024-08-20 PROCEDURE — 93005 ELECTROCARDIOGRAM TRACING: CPT

## 2025-06-24 ENCOUNTER — HOSPITAL ENCOUNTER (OUTPATIENT)
Dept: HOSPITAL 94 - RAD | Age: 40
Discharge: HOME | End: 2025-06-24
Attending: PHYSICIAN ASSISTANT
Payer: MEDICAID

## 2025-06-24 DIAGNOSIS — I49.8: Primary | ICD-10-CM

## 2025-06-24 DIAGNOSIS — F11.20: ICD-10-CM

## 2025-06-24 PROCEDURE — 93005 ELECTROCARDIOGRAM TRACING: CPT
